# Patient Record
Sex: MALE | Race: WHITE | NOT HISPANIC OR LATINO | Employment: FULL TIME | URBAN - METROPOLITAN AREA
[De-identification: names, ages, dates, MRNs, and addresses within clinical notes are randomized per-mention and may not be internally consistent; named-entity substitution may affect disease eponyms.]

---

## 2017-10-23 ENCOUNTER — OFFICE VISIT (OUTPATIENT)
Dept: ORTHOPEDIC SURGERY | Facility: CLINIC | Age: 57
End: 2017-10-23

## 2017-10-23 VITALS
SYSTOLIC BLOOD PRESSURE: 146 MMHG | HEIGHT: 76 IN | DIASTOLIC BLOOD PRESSURE: 86 MMHG | BODY MASS INDEX: 29.96 KG/M2 | HEART RATE: 65 BPM | WEIGHT: 246 LBS

## 2017-10-23 DIAGNOSIS — M20.22 HALLUX RIGIDUS, BILATERAL: Primary | ICD-10-CM

## 2017-10-23 DIAGNOSIS — M20.21 HALLUX RIGIDUS, BILATERAL: Primary | ICD-10-CM

## 2017-10-23 PROCEDURE — 20600 DRAIN/INJ JOINT/BURSA W/O US: CPT | Performed by: ORTHOPAEDIC SURGERY

## 2017-10-23 PROCEDURE — 99203 OFFICE O/P NEW LOW 30 MIN: CPT | Performed by: ORTHOPAEDIC SURGERY

## 2017-10-23 RX ORDER — TRIAMCINOLONE ACETONIDE 40 MG/ML
20 INJECTION, SUSPENSION INTRA-ARTICULAR; INTRAMUSCULAR
Status: COMPLETED | OUTPATIENT
Start: 2017-10-23 | End: 2017-10-23

## 2017-10-23 RX ORDER — LIDOCAINE HYDROCHLORIDE 10 MG/ML
1 INJECTION, SOLUTION INFILTRATION; PERINEURAL
Status: COMPLETED | OUTPATIENT
Start: 2017-10-23 | End: 2017-10-23

## 2017-10-23 RX ORDER — NAPROXEN SODIUM 220 MG
220 TABLET ORAL DAILY
COMMUNITY

## 2017-10-23 RX ORDER — LISINOPRIL 5 MG/1
TABLET ORAL
Refills: 0 | COMMUNITY
Start: 2017-10-10 | End: 2019-06-01

## 2017-10-23 RX ORDER — LIDOCAINE HYDROCHLORIDE 10 MG/ML
0.5 INJECTION, SOLUTION INFILTRATION; PERINEURAL
Status: COMPLETED | OUTPATIENT
Start: 2017-10-23 | End: 2017-10-23

## 2017-10-23 RX ORDER — OMEPRAZOLE 20 MG/1
20 CAPSULE, DELAYED RELEASE ORAL DAILY
COMMUNITY

## 2017-10-23 RX ADMIN — LIDOCAINE HYDROCHLORIDE 0.5 ML: 10 INJECTION, SOLUTION INFILTRATION; PERINEURAL at 15:46

## 2017-10-23 RX ADMIN — TRIAMCINOLONE ACETONIDE 20 MG: 40 INJECTION, SUSPENSION INTRA-ARTICULAR; INTRAMUSCULAR at 15:46

## 2017-10-23 RX ADMIN — LIDOCAINE HYDROCHLORIDE 1 ML: 10 INJECTION, SOLUTION INFILTRATION; PERINEURAL at 15:46

## 2017-10-23 NOTE — PROGRESS NOTES
[facility] [place] [facility] [place]    Subjective     Chief Complaint   Patient presents with   • Right Foot - Pain   • Left Foot - Pain        HPI    Teresa Ang is a 57 y.o. male. He presents today for evaluation of both his feet, specifically the great toes.  Old injuries while he is playing football in 1979, but the pain particularly in the right foot has been worsening recently.  The left side is something that he can live with.  Pain is worse with walking, as well as climbing stairs.  He has aching and stabbing pains, associated with stiffness.  The pain is well located in the metatarsal phalangeal joint region of both feet, right greater than left.      Patient Active Problem List   Diagnosis   • Hypertensive heart disease   • Hypertension   • Osteoarthritis   • Thomas's syndrome     Past Medical History:   Diagnosis Date   • Thomas's syndrome    • Hypertension       Past Surgical History:   Procedure Laterality Date   • KNEE SURGERY Bilateral       Family History   Problem Relation Age of Onset   • Heart attack Mother    • Cancer Father      Skin   • Heart attack Father      Social History     Social History   • Marital status:      Spouse name: N/A   • Number of children: N/A   • Years of education: N/A     Occupational History   • Not on file.     Social History Main Topics   • Smoking status: Never Smoker   • Smokeless tobacco: Never Used   • Alcohol use Yes      Comment: Moderately   • Drug use: No   • Sexual activity: Defer     Other Topics Concern   • Not on file     Social History Narrative    Right handed      Current Outpatient Prescriptions on File Prior to Visit   Medication Sig Dispense Refill   • aspirin 81 MG tablet Take 81 mg by mouth daily  WHEN REMEMBERS     • bisoprolol-hydrochlorothiazide (ZIAC) 5-6.25 MG per tablet Take 1 tablet by mouth daily.       No current facility-administered medications on file prior to visit.       No Known Allergies     Review of Systems    Constitutional: Negative for activity change, appetite change, chills, diaphoresis, fatigue, fever and unexpected weight change.   HENT: Positive for sinus pressure. Negative for congestion, dental problem, drooling, ear discharge, ear pain, facial swelling, hearing loss, mouth sores, nosebleeds, postnasal drip, rhinorrhea, sneezing, sore throat, tinnitus, trouble swallowing and voice change.    Eyes: Negative for photophobia, pain, discharge, redness, itching and visual disturbance.   Respiratory: Negative for apnea, cough, choking, chest tightness, shortness of breath, wheezing and stridor.    Cardiovascular: Negative for chest pain, palpitations and leg swelling.   Gastrointestinal: Negative for abdominal distention, abdominal pain, anal bleeding, blood in stool, constipation, diarrhea, nausea, rectal pain and vomiting.   Endocrine: Negative for cold intolerance, heat intolerance, polydipsia, polyphagia and polyuria.   Genitourinary: Negative for decreased urine volume, difficulty urinating, dysuria, enuresis, flank pain, frequency, genital sores, hematuria and urgency.   Musculoskeletal: Positive for arthralgias, gait problem and neck pain. Negative for back pain, joint swelling, myalgias and neck stiffness.   Skin: Negative for color change, pallor, rash and wound.   Allergic/Immunologic: Negative for environmental allergies, food allergies and immunocompromised state.   Neurological: Positive for facial asymmetry and headaches. Negative for dizziness, tremors, seizures, syncope, speech difficulty, weakness, light-headedness and numbness.   Hematological: Negative for adenopathy. Does not bruise/bleed easily.   Psychiatric/Behavioral: Negative for agitation, behavioral problems, confusion, decreased concentration, dysphoric mood, hallucinations, self-injury, sleep disturbance and suicidal ideas. The patient is not nervous/anxious and is not hyperactive.         Objective      Physical Exam  /86  Pulse  "65  Ht 76\" (193 cm)  Wt 246 lb (112 kg)  BMI 29.94 kg/m2    Body mass index is 29.94 kg/(m^2).    General:   Mental Status:  Alert   Appearance: Cooperative, in no acute distress   Build and Nutrition: Well-nourished and well developed male   Orientation: Alert and oriented to person, place and time   Posture: Normal   Gait: Normal    Integument:   Right foot: No skin lesions, no rash, no ecchymosis   Left foot: No skin lesions, no rash, no ecchymosis    Neurologic:   Sensation:    Right foot: Intact to light touch on the dorsal and plantar aspect    Left foot: Intact to light touch on the dorsal and plantar aspect   Motor:  Right lower extremity: 5/5 ankle dorsiflexors, and ankle plantar flexors  Left lower extremity: 5/5 ankle dorsiflexors, and ankle plantar flexors    Vascular:   Right lower extremity: 2+ dorsalis pedis pulse, prompt capillary refill   Left lower extremity: 2+ dorsalis pedis pulse, prompt capillary refill    Lower Extremities:   Right Foot:    Tenderness:  Tender at the first metatarsal phalangeal joint of the right foot    Effusion: None    Swelling:  None    Crepitus:  Mild the first metatarsal phalangeal joint    Atrophy:  None    Range of motion: Normal inversion and eversion, but limited motion at the metatarsal phalangeal joint, with a 20° arc of motion  Instability:  None  Deformities:  None  Functional testing: Negative Ly’s test   Left Foot:    Tenderness:  None    Effusion:  None    Swelling:  None    Crepitus:  None    Atrophy:  None    Range of motion: normal inversion and eversion, but limited motion at the metatarsal phalangeal   joint, with a 30° arc of motion.  Instability:  None  Deformities:  None  Functional testing: Negative Ly’s test      Imaging/Studies      Imaging Results (last 24 hours)     Procedure Component Value Units Date/Time    XR Foot 3+ View Bilateral [[id number]] Resulted:  10/23/17 1533     Updated:  10/23/17 1534    Narrative:       Right Foot " Radiographs  Indication: right foot pain  Views: Weight-bearing AP and lateral, with oblique views of the right foot    Comparison: no prior studies available for review    Findings:  Arthritic changes are seen in the first metatarsal phalangeal joint, with   spur formation and joint space narrowing     Left Foot Radiographs  Indication: left foot pain  Views: Weight-bearing AP and lateral, with oblique views of the left foot    Comparison: no prior studies available for review    Findings:  Arthritic changes are seen, with mild joint space narrowing, but less   involved than the right foot.          Assessment and Plan     Teresa was seen today for pain and pain.    Diagnoses and all orders for this visit:    Hallux rigidus, bilateral  -     XR Foot 3+ View Bilateral    Other orders  -     Small Joint Arthrocentesis        I reviewed my findings with patient today.  Both of his metatarsal phalangeal joints bother him, consistent with hallux rigidus.  The right side is more involved than the left.  He would like to try an injection for the right foot, but may be interested in surgery in the future.  I will see him back in 6 weeks, but sooner for any problems.    He had 100% pain relief just a few minutes following the injection.    Return in about 6 weeks (around 12/4/2017) for Recheck without X-Rays.      Medical Decision Making  Management Options : prescription/IM medicine  Data/Risk: radiology tests and independent visualization of imaging, lab tests, or EMG/NCV      Hunter Aponte MD  10/23/17  4:06 PM

## 2017-12-04 ENCOUNTER — OFFICE VISIT (OUTPATIENT)
Dept: ORTHOPEDIC SURGERY | Facility: CLINIC | Age: 57
End: 2017-12-04

## 2017-12-04 VITALS
BODY MASS INDEX: 30.66 KG/M2 | SYSTOLIC BLOOD PRESSURE: 158 MMHG | HEART RATE: 66 BPM | HEIGHT: 76 IN | DIASTOLIC BLOOD PRESSURE: 92 MMHG | WEIGHT: 251.77 LBS

## 2017-12-04 DIAGNOSIS — M20.21 HALLUX RIGIDUS, BILATERAL: Primary | ICD-10-CM

## 2017-12-04 DIAGNOSIS — M20.22 HALLUX RIGIDUS, BILATERAL: Primary | ICD-10-CM

## 2017-12-04 PROCEDURE — 99212 OFFICE O/P EST SF 10 MIN: CPT | Performed by: ORTHOPAEDIC SURGERY

## 2017-12-04 NOTE — PROGRESS NOTES
"    [facility] – [place] [place] [facility] Note    Subjective     Chief Complaint   Patient presents with   • Right Foot - Pain, Follow-up   • Follow-up     6 week-right foot hallux rigidus        HPI    Teresa Ang is a 57 y.o. male. He follows up today for his right foot.  Pain is markedly improved after the injection on his last visit.  No complaints today.  Fully ambulatory in regular shoe gear.      Patient Active Problem List   Diagnosis   • Hypertensive heart disease   • Hypertension   • Osteoarthritis   • Thomas's syndrome     Past Medical History:   Diagnosis Date   • Thomas's syndrome    • Hypertension       Past Surgical History:   Procedure Laterality Date   • KNEE SURGERY Bilateral       Family History   Problem Relation Age of Onset   • Heart attack Mother    • Cancer Father      Skin   • Heart attack Father      Social History     Social History   • Marital status:      Spouse name: N/A   • Number of children: N/A   • Years of education: N/A     Occupational History   • Not on file.     Social History Main Topics   • Smoking status: Never Smoker   • Smokeless tobacco: Never Used   • Alcohol use Yes      Comment: Moderately   • Drug use: No   • Sexual activity: Defer     Other Topics Concern   • Not on file     Social History Narrative    Right handed      Current Outpatient Prescriptions on File Prior to Visit   Medication Sig Dispense Refill   • aspirin 81 MG tablet Take 81 mg by mouth As Needed. WHEN REMEMBERS      • bisoprolol-hydrochlorothiazide (ZIAC) 5-6.25 MG per tablet Take 1 tablet by mouth daily.     • lisinopril (PRINIVIL,ZESTRIL) 5 MG tablet   0   • naproxen sodium (ALEVE) 220 MG tablet Take 220 mg by mouth Daily.     • omeprazole (priLOSEC) 20 MG capsule Take 20 mg by mouth Daily.       No current facility-administered medications on file prior to visit.       No Known Allergies     Review of Systems     Objective      Physical Exam  /92  Pulse 66  Ht 76\" (193 cm)  Wt 251 lb " 12.3 oz (114 kg)  BMI 30.65 kg/m2    Body mass index is 30.65 kg/(m^2).    General:   Mental Status:  Alert   Appearance: Cooperative, in no acute distress   Build and Nutrition: Well-nourished and well developed male   Orientation: Alert and oriented to person, place and time   Posture: Normal   Gait: Normal    Integument:   Right foot: No skin lesions, no rash, no ecchymosis    Lower Extremities:   Right Foot:    Tenderness:  None    Swelling:  None    Range of motion: Normal inversion and eversion  Deformities:  None      Assessment and Plan     Teresa was seen today for follow-up, pain and follow-up.    Diagnoses and all orders for this visit:    Hallux rigidus, bilateral        I reviewed my findings with patient today.  His right foot respond well to the intra-articular injection for hallux rigidus.  I will see him back if there is any worsening or problems in the future.  Repeat injection could be considered.  Long-term, he may be a candidate for surgical intervention.    No Follow-up on file.          Hunter Aponte MD  12/04/17  4:27 PM

## 2019-06-02 PROCEDURE — 87329 GIARDIA AG IA: CPT | Performed by: NURSE PRACTITIONER

## 2019-06-02 PROCEDURE — 87177 OVA AND PARASITES SMEARS: CPT | Performed by: NURSE PRACTITIONER

## 2019-06-02 PROCEDURE — 87046 STOOL CULTR AEROBIC BACT EA: CPT | Performed by: NURSE PRACTITIONER

## 2019-06-02 PROCEDURE — 87045 FECES CULTURE AEROBIC BACT: CPT | Performed by: NURSE PRACTITIONER

## 2019-06-02 PROCEDURE — 87209 SMEAR COMPLEX STAIN: CPT | Performed by: NURSE PRACTITIONER

## 2019-06-02 PROCEDURE — 87328 CRYPTOSPORIDIUM AG IA: CPT | Performed by: NURSE PRACTITIONER

## 2019-06-06 ENCOUNTER — TELEPHONE (OUTPATIENT)
Dept: URGENT CARE | Facility: CLINIC | Age: 59
End: 2019-06-06

## 2019-06-07 ENCOUNTER — TELEPHONE (OUTPATIENT)
Dept: URGENT CARE | Facility: CLINIC | Age: 59
End: 2019-06-07

## 2019-11-06 DIAGNOSIS — I11.9 HYPERTENSIVE HEART DISEASE, UNSPECIFIED WHETHER HEART FAILURE PRESENT: Primary | ICD-10-CM

## 2019-11-07 ENCOUNTER — CONSULT (OUTPATIENT)
Dept: CARDIOLOGY | Facility: CLINIC | Age: 59
End: 2019-11-07

## 2019-11-07 ENCOUNTER — HOSPITAL ENCOUNTER (OUTPATIENT)
Dept: GENERAL RADIOLOGY | Facility: HOSPITAL | Age: 59
Discharge: HOME | End: 2019-11-07
Admitting: INTERNAL MEDICINE

## 2019-11-07 VITALS
HEART RATE: 72 BPM | SYSTOLIC BLOOD PRESSURE: 167 MMHG | HEIGHT: 76 IN | WEIGHT: 255 LBS | DIASTOLIC BLOOD PRESSURE: 106 MMHG | BODY MASS INDEX: 31.05 KG/M2

## 2019-11-07 DIAGNOSIS — E78.5 DYSLIPIDEMIA: ICD-10-CM

## 2019-11-07 DIAGNOSIS — I10 ESSENTIAL HYPERTENSION: ICD-10-CM

## 2019-11-07 DIAGNOSIS — R07.9 CHEST PAIN, UNSPECIFIED TYPE: Primary | ICD-10-CM

## 2019-11-07 DIAGNOSIS — R06.09 EXERTIONAL DYSPNEA: ICD-10-CM

## 2019-11-07 DIAGNOSIS — I11.9 HYPERTENSIVE HEART DISEASE, UNSPECIFIED WHETHER HEART FAILURE PRESENT: ICD-10-CM

## 2019-11-07 PROCEDURE — 93000 ELECTROCARDIOGRAM COMPLETE: CPT | Performed by: INTERNAL MEDICINE

## 2019-11-07 PROCEDURE — 99204 OFFICE O/P NEW MOD 45 MIN: CPT | Performed by: INTERNAL MEDICINE

## 2019-11-07 PROCEDURE — 71046 X-RAY EXAM CHEST 2 VIEWS: CPT

## 2019-11-07 RX ORDER — CARVEDILOL 25 MG/1
25 TABLET ORAL 2 TIMES DAILY WITH MEALS
Qty: 60 TABLET | Refills: 11 | Status: SHIPPED | OUTPATIENT
Start: 2019-11-07 | End: 2020-10-26

## 2019-11-07 RX ORDER — NITROGLYCERIN 0.4 MG/1
TABLET SUBLINGUAL
Qty: 25 TABLET | Refills: 3 | Status: SHIPPED | OUTPATIENT
Start: 2019-11-07 | End: 2019-11-11 | Stop reason: HOSPADM

## 2019-11-07 RX ORDER — CARVEDILOL 12.5 MG/1
12.5 TABLET ORAL 2 TIMES DAILY WITH MEALS
COMMUNITY
End: 2019-11-07 | Stop reason: SDUPTHER

## 2019-11-07 NOTE — H&P (VIEW-ONLY)
"[facility] at [facility]  INITIAL OFFICE CONSULT    Teresa Ang  : 1960  MRN:[id number]  Patient Address:  [address]  [place] [address]    Date of Encounter: 2019    PCP: Fuad Alexis MD  [id number] [facility] [id number]  Referring MD: Dr. Alexis     IDENTIFICATION: A 59 y.o. male resident of [place], [place]     Chief Complaint   Patient presents with   • Chest Pain     PROBLEM LIST:   1. Chest pain and exertional dyspnea   a. Echo 16: EF 65-70%, mild LVH   b. Chest pressure and worsening dyspnea Fall   2. Hypertension   3. Thomas's syndrome, right eye   4. Dyslipidemia   5. GERD    ALLERGIES: No Known Allergies    CURRENT MEDICATIONS:   •  aspirin 81 MG tablet, Take 81 mg by mouth Daily.  •  carvedilol (COREG) 12.5 MG tablet, Take 12.5 mg by mouth 2 (Two) Times a Day With Meals.  •  hydrALAZINE (APRESOLINE) 10 MG tablet, Take 20 mg by mouth 2 (Two) Times a Day  •  naproxen sodium (ALEVE) 220 MG tablet, Take 220 mg by mouth Daily  •  omeprazole (priLOSEC) 20 MG capsule, Take 20 mg by mouth Daily.    HPI: Mr. Ang is a pleasant 60 y/o WM with history of HTN who is referred from Dr. Alexis for recent onset chest pain. He reports a 3 month history of precordial chest tightness/heaviness at rest or with exertion that is relatively brief in nature but \"irritating.\". Two weeks ago he reports a more significant episode where he thought he was going to have to go to the ER. He had midsternal chest pain and pressure which lasted <5 minutes and was not associated with any significant symptoms. He is very active and is currently working as a . He reports worsening exertional dyspnea for the past few months as well, especially when going up an incline or having to walk fast or jog. He denies palpitations, syncope or heart failure symptoms. No history of MI, CVA, DVT/PE.  He also reports recent elevated blood pressures, and his medical regimen " "was adjusted by Dr. Alexis just 2 days ago. No tobacco, occasional alcohol, no drug use. No significant family history of premature CAD.     Cardiac Risk Factors include: advanced age (older than 55 for men, 65 for women), hypertension and male gender    ROS: All systems have been reviewed and are negative with the exception of those mentioned in the HPI and problem list above.    Surgical History:   Past Surgical History:   Procedure Laterality Date   • KNEE SURGERY Bilateral        Social History:   Social History     Socioeconomic History   • Marital status:    Tobacco Use   • Smoking status: Never Smoker   • Smokeless tobacco: Never Used   Substance and Sexual Activity   • Alcohol use: Yes     Comment: Moderately   • Drug use: No   • Sexual activity: Defer     Family History:   Family History   Problem Relation Age of Onset   • Heart attack Mother    • Cancer Father         Skin   • Heart attack Father        Objective     Vitals:    11/07/19 1021 11/07/19 1027 11/07/19 1028   BP: (!) 138/101 (!) 162/107 (!) 167/106   BP Location: Right arm Left arm Left arm   Patient Position: Sitting Sitting Standing   Pulse: 72 73 72   Weight: 116 kg (255 lb)     Height: 193 cm (76\")       Body mass index is 31.04 kg/m².    PHYSICAL EXAM:  CONSTITUTIONAL: Well nourished, cooperative, in no acute distress  HEENT: Normocephalic, atraumatic, PERRLA, no JVD, no carotid bruit  CARDIOVASCULAR:  Regular rhythm and normal rate, no murmur, gallop, rub. Peripheral pulses are present and equal bilaterally  RESPIRATORY: Clear to auscultation, normal respiratory effort, no wheezing, rales or ronchi  GI: Soft, nontender, normal bowel sounds  MUSCULOSKELETAL: No gross deformities, no edema  SKIN: Warm, dry. No bleeding, bruising or rash  NEUROLOGICAL: No focal deficits  PSYCHIATRIC: Normal mood and affect. Behavior is normal     Labs/Diagnostic Data  Labs 12/2018:  CBC: WBC 4.9, RBC 5.26, Hgb 16.4, Hct 45.9, Plt 198  Sed rate: 2; " C-RP <0.2  TSH: 2.73  CMP: Na 143, K 4.6, Cl 107, CO2 32, Glucose 128, BUN 18, Cr 1.1, eGFR 73, Alk Phos 68, AST 20, ALT 38  Lipid Panel: , HDL 54, LDL 76, Trig 275      ECG 12 Lead  Date/Time: 11/7/2019 11:09 AM  Performed by: Jessica Mckenzie PA-C  Authorized by: Jessica Mckenzie PA-C   Comparison: compared with previous ECG from 6/30/2016  Similar to previous ECG  Rhythm: sinus rhythm  Rate: normal  BPM: 70  Conduction: conduction normal  QRS axis: normal    Clinical impression: normal ECG          Radiology Data:   CXR images: No acute cardiopulmonary process. Official report pending.     Assessment and Plan:     1. Exertional dyspnea and chest pressure: Symptoms are concerning for recent onset angina and he needs a heart catheterization for further evaluation. We will give him SL Nitro to use as needed, and increase his Coreg to 25mg BID.  2. HTN: elevated, see  #1.   3. HLD: will check lipid panel at time of cath.  4. Final disposition to follow.     Thank you for allowing me to participate in the care of Teresa Ang. Feel free to contact me directly with any further questions or concerns.    Scribed for Hunter Branham MD by Jessica Mckenzie PA-C. 11/7/2019  10:32 AM       Update 11/11/2019  Patient is here today in [facility] to undergo LHC +/- PCI, Dr. Branham - as mentioned above.  Labs are pending.  No change in history since office visit 11/7.    Risks and benefits have been reviewed with patient and he is willing to proceed.  Further recommendations based on outcomes.  Jovanna Berman PA-C  11/11/19 0821 a.m.    Electronically signed by ENRICO Vargas, 11/11/19, 8:21 AM.

## 2019-11-07 NOTE — PROGRESS NOTES
"[facility] at [facility]  INITIAL OFFICE CONSULT    Teresa Ang  : 1960  MRN:[id number]  Patient Address:  [address]  [place] [address]    Date of Encounter: 2019    PCP: Fuad Alexis MD  [id number] [facility] [address]  Referring MD: Dr. Alexis     IDENTIFICATION: A 59 y.o. male resident of [place], [place]     Chief Complaint   Patient presents with   • Chest Pain     PROBLEM LIST:   1. Chest pain and exertional dyspnea   a. Echo 16: EF 65-70%, mild LVH   b. Chest pressure and worsening dyspnea Fall   2. Hypertension   3. Thomas's syndrome, right eye   4. Dyslipidemia   5. GERD    ALLERGIES: No Known Allergies    CURRENT MEDICATIONS:   •  aspirin 81 MG tablet, Take 81 mg by mouth Daily.  •  carvedilol (COREG) 12.5 MG tablet, Take 12.5 mg by mouth 2 (Two) Times a Day With Meals.  •  hydrALAZINE (APRESOLINE) 10 MG tablet, Take 20 mg by mouth 2 (Two) Times a Day  •  naproxen sodium (ALEVE) 220 MG tablet, Take 220 mg by mouth Daily  •  omeprazole (priLOSEC) 20 MG capsule, Take 20 mg by mouth Daily.    HPI: Mr. Ang is a pleasant 58 y/o WM with history of HTN who is referred from Dr. Alexis for recent onset chest pain. He reports a 3 month history of precordial chest tightness/heaviness at rest or with exertion that is relatively brief in nature but \"irritating.\". Two weeks ago he reports a more significant episode where he thought he was going to have to go to the ER. He had midsternal chest pain and pressure which lasted <5 minutes and was not associated with any significant symptoms. He is very active and is currently working as a . He reports worsening exertional dyspnea for the past few months as well, especially when going up an incline or having to walk fast or jog. He denies palpitations, syncope or heart failure symptoms. No history of MI, CVA, DVT/PE.  He also reports recent elevated blood pressures, and his medical regimen " "was adjusted by Dr. Alexis just 2 days ago. No tobacco, occasional alcohol, no drug use. No significant family history of premature CAD.     Cardiac Risk Factors include: advanced age (older than 55 for men, 65 for women), hypertension and male gender    ROS: All systems have been reviewed and are negative with the exception of those mentioned in the HPI and problem list above.    Surgical History:   Past Surgical History:   Procedure Laterality Date   • KNEE SURGERY Bilateral        Social History:   Social History     Socioeconomic History   • Marital status:    Tobacco Use   • Smoking status: Never Smoker   • Smokeless tobacco: Never Used   Substance and Sexual Activity   • Alcohol use: Yes     Comment: Moderately   • Drug use: No   • Sexual activity: Defer     Family History:   Family History   Problem Relation Age of Onset   • Heart attack Mother    • Cancer Father         Skin   • Heart attack Father        Objective     Vitals:    11/07/19 1021 11/07/19 1027 11/07/19 1028   BP: (!) 138/101 (!) 162/107 (!) 167/106   BP Location: Right arm Left arm Left arm   Patient Position: Sitting Sitting Standing   Pulse: 72 73 72   Weight: 116 kg (255 lb)     Height: 193 cm (76\")       Body mass index is 31.04 kg/m².    PHYSICAL EXAM:  CONSTITUTIONAL: Well nourished, cooperative, in no acute distress  HEENT: Normocephalic, atraumatic, PERRLA, no JVD, no carotid bruit  CARDIOVASCULAR:  Regular rhythm and normal rate, no murmur, gallop, rub. Peripheral pulses are present and equal bilaterally  RESPIRATORY: Clear to auscultation, normal respiratory effort, no wheezing, rales or ronchi  GI: Soft, nontender, normal bowel sounds  MUSCULOSKELETAL: No gross deformities, no edema  SKIN: Warm, dry. No bleeding, bruising or rash  NEUROLOGICAL: No focal deficits  PSYCHIATRIC: Normal mood and affect. Behavior is normal     Labs/Diagnostic Data  Labs 12/2018:  CBC: WBC 4.9, RBC 5.26, Hgb 16.4, Hct 45.9, Plt 198  Sed rate: 2; " C-RP <0.2  TSH: 2.73  CMP: Na 143, K 4.6, Cl 107, CO2 32, Glucose 128, BUN 18, Cr 1.1, eGFR 73, Alk Phos 68, AST 20, ALT 38  Lipid Panel: , HDL 54, LDL 76, Trig 275      ECG 12 Lead  Date/Time: 11/7/2019 11:09 AM  Performed by: Jessica Mckenzie PA-C  Authorized by: Jessica Mckenzie PA-C   Comparison: compared with previous ECG from 6/30/2016  Similar to previous ECG  Rhythm: sinus rhythm  Rate: normal  BPM: 70  Conduction: conduction normal  QRS axis: normal    Clinical impression: normal ECG          Radiology Data:   CXR images: No acute cardiopulmonary process. Official report pending.     Assessment and Plan:     1. Exertional dyspnea and chest pressure: Symptoms are concerning for recent onset angina and he needs a heart catheterization for further evaluation. We will give him SL Nitro to use as needed, and increase his Coreg to 25mg BID.  2. HTN: elevated, see  #1.   3. HLD: will check lipid panel at time of cath.  4. Final disposition to follow.     Thank you for allowing me to participate in the care of Teresa Ang. Feel free to contact me directly with any further questions or concerns.    Scribed for Hunter Branham MD by Jessica Mckenzie PA-C. 11/7/2019  10:32 AM   Electronically signed by Hunter Branham MD, 11/19/19, 1:33 PM.      Update 11/11/2019  Patient is here today in [facility] to undergo LHC +/- PCI, Dr. Branham - as mentioned above.  Labs are pending.  No change in history since office visit 11/7.    Risks and benefits have been reviewed with patient and he is willing to proceed.  Further recommendations based on outcomes.  Jovanna Berman PA-C  11/11/19 0821 a.m.    Electronically signed by ENRICO Vargas, 11/11/19, 8:21 AM.

## 2019-11-08 ENCOUNTER — PREP FOR SURGERY (OUTPATIENT)
Dept: OTHER | Facility: HOSPITAL | Age: 59
End: 2019-11-08

## 2019-11-08 PROBLEM — E78.5 DYSLIPIDEMIA: Status: ACTIVE | Noted: 2019-11-08

## 2019-11-08 PROBLEM — R07.9 CHEST PAIN: Status: ACTIVE | Noted: 2019-11-08

## 2019-11-08 PROBLEM — R06.09 EXERTIONAL DYSPNEA: Status: ACTIVE | Noted: 2019-11-08

## 2019-11-08 PROBLEM — I10 ESSENTIAL HYPERTENSION: Status: ACTIVE | Noted: 2019-11-08

## 2019-11-08 RX ORDER — SODIUM CHLORIDE 0.9 % (FLUSH) 0.9 %
3 SYRINGE (ML) INJECTION EVERY 12 HOURS SCHEDULED
Status: CANCELLED | OUTPATIENT
Start: 2019-11-08

## 2019-11-08 RX ORDER — ACETAMINOPHEN 325 MG/1
650 TABLET ORAL EVERY 4 HOURS PRN
Status: CANCELLED | OUTPATIENT
Start: 2019-11-08

## 2019-11-08 RX ORDER — CLOPIDOGREL BISULFATE 75 MG/1
75 TABLET ORAL DAILY
Status: CANCELLED | OUTPATIENT
Start: 2019-11-09

## 2019-11-08 RX ORDER — CLOPIDOGREL BISULFATE 75 MG/1
600 TABLET ORAL ONCE
Status: CANCELLED | OUTPATIENT
Start: 2019-11-08 | End: 2019-11-08

## 2019-11-08 RX ORDER — SODIUM CHLORIDE 0.9 % (FLUSH) 0.9 %
10 SYRINGE (ML) INJECTION AS NEEDED
Status: CANCELLED | OUTPATIENT
Start: 2019-11-08

## 2019-11-08 RX ORDER — NITROGLYCERIN 0.4 MG/1
0.4 TABLET SUBLINGUAL
Status: CANCELLED | OUTPATIENT
Start: 2019-11-08

## 2019-11-11 ENCOUNTER — HOSPITAL ENCOUNTER (OUTPATIENT)
Facility: HOSPITAL | Age: 59
Discharge: HOME | End: 2019-11-11
Attending: INTERNAL MEDICINE | Admitting: INTERNAL MEDICINE

## 2019-11-11 ENCOUNTER — APPOINTMENT (OUTPATIENT)
Dept: CARDIOLOGY | Facility: HOSPITAL | Age: 59
End: 2019-11-11

## 2019-11-11 VITALS
HEART RATE: 62 BPM | BODY MASS INDEX: 31.14 KG/M2 | DIASTOLIC BLOOD PRESSURE: 116 MMHG | OXYGEN SATURATION: 97 % | SYSTOLIC BLOOD PRESSURE: 143 MMHG | WEIGHT: 255.73 LBS | HEIGHT: 76 IN | RESPIRATION RATE: 16 BRPM | TEMPERATURE: 97.3 F

## 2019-11-11 DIAGNOSIS — I10 ESSENTIAL HYPERTENSION: ICD-10-CM

## 2019-11-11 DIAGNOSIS — E78.5 DYSLIPIDEMIA: ICD-10-CM

## 2019-11-11 DIAGNOSIS — R07.9 CHEST PAIN, UNSPECIFIED TYPE: ICD-10-CM

## 2019-11-11 DIAGNOSIS — R06.09 EXERTIONAL DYSPNEA: ICD-10-CM

## 2019-11-11 LAB
ALBUMIN SERPL-MCNC: 3.7 G/DL (ref 3.5–5.2)
ALBUMIN/GLOB SERPL: 1.3 G/DL
ALP SERPL-CCNC: 59 U/L (ref 39–117)
ALT SERPL W P-5'-P-CCNC: 23 U/L (ref 1–41)
ANION GAP SERPL CALCULATED.3IONS-SCNC: 8 MMOL/L (ref 5–15)
AST SERPL-CCNC: 22 U/L (ref 1–40)
BH CV ECHO MEAS - AO ROOT AREA (BSA CORRECTED): 1.5
BH CV ECHO MEAS - AO ROOT AREA: 10.4 CM^2
BH CV ECHO MEAS - AO ROOT DIAM: 3.6 CM
BH CV ECHO MEAS - BSA(HAYCOCK): 2.5 M^2
BH CV ECHO MEAS - BSA: 2.5 M^2
BH CV ECHO MEAS - BZI_BMI: 31.2 KILOGRAMS/M^2
BH CV ECHO MEAS - BZI_METRIC_HEIGHT: 193 CM
BH CV ECHO MEAS - BZI_METRIC_WEIGHT: 116.1 KG
BH CV ECHO MEAS - EDV(CUBED): 75.2 ML
BH CV ECHO MEAS - EDV(MOD-SP2): 98 ML
BH CV ECHO MEAS - EDV(MOD-SP4): 176 ML
BH CV ECHO MEAS - EDV(TEICH): 79.5 ML
BH CV ECHO MEAS - EF(CUBED): 73.3 %
BH CV ECHO MEAS - EF(MOD-SP2): 49 %
BH CV ECHO MEAS - EF(MOD-SP4): 52.3 %
BH CV ECHO MEAS - EF(TEICH): 65.4 %
BH CV ECHO MEAS - ESV(CUBED): 20.1 ML
BH CV ECHO MEAS - ESV(MOD-SP2): 50 ML
BH CV ECHO MEAS - ESV(MOD-SP4): 84 ML
BH CV ECHO MEAS - ESV(TEICH): 27.5 ML
BH CV ECHO MEAS - FS: 35.6 %
BH CV ECHO MEAS - IVS/LVPW: 0.98
BH CV ECHO MEAS - IVSD: 1 CM
BH CV ECHO MEAS - LA DIMENSION: 2.9 CM
BH CV ECHO MEAS - LA/AO: 0.81
BH CV ECHO MEAS - LAD MAJOR: 4.2 CM
BH CV ECHO MEAS - LAT PEAK E' VEL: 8 CM/SEC
BH CV ECHO MEAS - LATERAL E/E' RATIO: 5.4
BH CV ECHO MEAS - LV DIASTOLIC VOL/BSA (35-75): 71.5 ML/M^2
BH CV ECHO MEAS - LV MASS(C)D: 143 GRAMS
BH CV ECHO MEAS - LV MASS(C)DI: 58.1 GRAMS/M^2
BH CV ECHO MEAS - LV MAX PG: 1.8 MMHG
BH CV ECHO MEAS - LV MEAN PG: 0.85 MMHG
BH CV ECHO MEAS - LV SYSTOLIC VOL/BSA (12-30): 34.1 ML/M^2
BH CV ECHO MEAS - LV V1 MAX: 67.1 CM/SEC
BH CV ECHO MEAS - LV V1 MEAN: 42.7 CM/SEC
BH CV ECHO MEAS - LV V1 VTI: 16.1 CM
BH CV ECHO MEAS - LVIDD: 4.2 CM
BH CV ECHO MEAS - LVIDS: 2.7 CM
BH CV ECHO MEAS - LVLD AP2: 8.1 CM
BH CV ECHO MEAS - LVLD AP4: 10.5 CM
BH CV ECHO MEAS - LVLS AP2: 6.1 CM
BH CV ECHO MEAS - LVLS AP4: 8.6 CM
BH CV ECHO MEAS - LVOT AREA (M): 3.1 CM^2
BH CV ECHO MEAS - LVOT AREA: 3 CM^2
BH CV ECHO MEAS - LVOT DIAM: 2 CM
BH CV ECHO MEAS - LVPWD: 1 CM
BH CV ECHO MEAS - MED PEAK E' VEL: 5.6 CM/SEC
BH CV ECHO MEAS - MEDIAL E/E' RATIO: 7.7
BH CV ECHO MEAS - MV A MAX VEL: 48.4 CM/SEC
BH CV ECHO MEAS - MV DEC TIME: 0.14 SEC
BH CV ECHO MEAS - MV E MAX VEL: 44.4 CM/SEC
BH CV ECHO MEAS - MV E/A: 0.92
BH CV ECHO MEAS - PA ACC SLOPE: 398.7 CM/SEC^2
BH CV ECHO MEAS - PA ACC TIME: 0.12 SEC
BH CV ECHO MEAS - PA PR(ACCEL): 25.5 MMHG
BH CV ECHO MEAS - RVDD: 2.8 CM
BH CV ECHO MEAS - SI(CUBED): 22.4 ML/M^2
BH CV ECHO MEAS - SI(LVOT): 19.9 ML/M^2
BH CV ECHO MEAS - SI(MOD-SP2): 19.5 ML/M^2
BH CV ECHO MEAS - SI(MOD-SP4): 37.4 ML/M^2
BH CV ECHO MEAS - SI(TEICH): 21.1 ML/M^2
BH CV ECHO MEAS - SV(CUBED): 55.1 ML
BH CV ECHO MEAS - SV(LVOT): 49 ML
BH CV ECHO MEAS - SV(MOD-SP2): 48 ML
BH CV ECHO MEAS - SV(MOD-SP4): 92 ML
BH CV ECHO MEAS - SV(TEICH): 52 ML
BH CV ECHO MEAS - TAPSE (>1.6): 2.2 CM2
BH CV ECHO MEASUREMENTS AVERAGE E/E' RATIO: 6.53
BH CV VAS BP LEFT ARM: NORMAL MMHG
BH CV XLRA - RV BASE: 4.7 CM
BH CV XLRA - RV LENGTH: 7.6 CM
BH CV XLRA - RV MID: 3.5 CM
BH CV XLRA - TDI S': 15.2 CM/SEC
BILIRUB SERPL-MCNC: 0.5 MG/DL (ref 0.2–1.2)
BUN BLD-MCNC: 20 MG/DL (ref 8–23)
BUN/CREAT SERPL: 21.7 (ref 7–25)
CALCIUM SPEC-SCNC: 9 MG/DL (ref 8.6–10.5)
CHLORIDE SERPL-SCNC: 104 MMOL/L (ref 98–107)
CHOLEST SERPL-MCNC: 179 MG/DL (ref 0–200)
CO2 SERPL-SCNC: 26 MMOL/L (ref 22–29)
CREAT BLD-MCNC: 0.92 MG/DL (ref 0.76–1.27)
DEPRECATED RDW RBC AUTO: 40.7 FL (ref 37–54)
ERYTHROCYTE [DISTWIDTH] IN BLOOD BY AUTOMATED COUNT: 12.3 % (ref 12.3–15.4)
GFR SERPL CREATININE-BSD FRML MDRD: 84 ML/MIN/1.73
GLOBULIN UR ELPH-MCNC: 2.9 GM/DL
GLUCOSE BLD-MCNC: 129 MG/DL (ref 65–99)
HBA1C MFR BLD: 5.3 % (ref 4.8–5.6)
HCT VFR BLD AUTO: 45.6 % (ref 37.5–51)
HDLC SERPL-MCNC: 59 MG/DL (ref 40–60)
HGB BLD-MCNC: 15.6 G/DL (ref 13–17.7)
LDLC SERPL CALC-MCNC: 92 MG/DL (ref 0–100)
LDLC/HDLC SERPL: 1.57 {RATIO}
MAXIMAL PREDICTED HEART RATE: 159 BPM
MCH RBC QN AUTO: 31.1 PG (ref 26.6–33)
MCHC RBC AUTO-ENTMCNC: 34.2 G/DL (ref 31.5–35.7)
MCV RBC AUTO: 91 FL (ref 79–97)
PLATELET # BLD AUTO: 180 10*3/MM3 (ref 140–450)
PMV BLD AUTO: 10.3 FL (ref 6–12)
POTASSIUM BLD-SCNC: 4.2 MMOL/L (ref 3.5–5.2)
PROT SERPL-MCNC: 6.6 G/DL (ref 6–8.5)
RBC # BLD AUTO: 5.01 10*6/MM3 (ref 4.14–5.8)
SODIUM BLD-SCNC: 138 MMOL/L (ref 136–145)
STRESS TARGET HR: 135 BPM
TRIGL SERPL-MCNC: 138 MG/DL (ref 0–150)
VLDLC SERPL-MCNC: 27.6 MG/DL
WBC NRBC COR # BLD: 6.45 10*3/MM3 (ref 3.4–10.8)

## 2019-11-11 PROCEDURE — 25010000002 FENTANYL CITRATE (PF) 100 MCG/2ML SOLUTION: Performed by: INTERNAL MEDICINE

## 2019-11-11 PROCEDURE — 80061 LIPID PANEL: CPT | Performed by: PHYSICIAN ASSISTANT

## 2019-11-11 PROCEDURE — 93325 DOPPLER ECHO COLOR FLOW MAPG: CPT | Performed by: INTERNAL MEDICINE

## 2019-11-11 PROCEDURE — C1760 CLOSURE DEV, VASC: HCPCS | Performed by: INTERNAL MEDICINE

## 2019-11-11 PROCEDURE — 93321 DOPPLER ECHO F-UP/LMTD STD: CPT | Performed by: INTERNAL MEDICINE

## 2019-11-11 PROCEDURE — 93458 L HRT ARTERY/VENTRICLE ANGIO: CPT | Performed by: INTERNAL MEDICINE

## 2019-11-11 PROCEDURE — 36415 COLL VENOUS BLD VENIPUNCTURE: CPT

## 2019-11-11 PROCEDURE — 0 IOPAMIDOL PER 1 ML: Performed by: INTERNAL MEDICINE

## 2019-11-11 PROCEDURE — C1769 GUIDE WIRE: HCPCS | Performed by: INTERNAL MEDICINE

## 2019-11-11 PROCEDURE — 93308 TTE F-UP OR LMTD: CPT | Performed by: INTERNAL MEDICINE

## 2019-11-11 PROCEDURE — 80053 COMPREHEN METABOLIC PANEL: CPT | Performed by: PHYSICIAN ASSISTANT

## 2019-11-11 PROCEDURE — 25010000002 MIDAZOLAM PER 1 MG: Performed by: INTERNAL MEDICINE

## 2019-11-11 PROCEDURE — 93306 TTE W/DOPPLER COMPLETE: CPT

## 2019-11-11 PROCEDURE — 85027 COMPLETE CBC AUTOMATED: CPT | Performed by: PHYSICIAN ASSISTANT

## 2019-11-11 PROCEDURE — 83036 HEMOGLOBIN GLYCOSYLATED A1C: CPT | Performed by: PHYSICIAN ASSISTANT

## 2019-11-11 PROCEDURE — C1894 INTRO/SHEATH, NON-LASER: HCPCS | Performed by: INTERNAL MEDICINE

## 2019-11-11 RX ORDER — LIDOCAINE HYDROCHLORIDE 10 MG/ML
INJECTION, SOLUTION EPIDURAL; INFILTRATION; INTRACAUDAL; PERINEURAL AS NEEDED
Status: DISCONTINUED | OUTPATIENT
Start: 2019-11-11 | End: 2019-11-11 | Stop reason: HOSPADM

## 2019-11-11 RX ORDER — ASPIRIN 81 MG/1
81 TABLET, CHEWABLE ORAL DAILY
Status: DISCONTINUED | OUTPATIENT
Start: 2019-11-11 | End: 2019-11-11

## 2019-11-11 RX ORDER — NITROGLYCERIN 0.4 MG/1
0.4 TABLET SUBLINGUAL
Status: DISCONTINUED | OUTPATIENT
Start: 2019-11-11 | End: 2019-11-11 | Stop reason: HOSPADM

## 2019-11-11 RX ORDER — MIDAZOLAM HYDROCHLORIDE 1 MG/ML
INJECTION INTRAMUSCULAR; INTRAVENOUS AS NEEDED
Status: DISCONTINUED | OUTPATIENT
Start: 2019-11-11 | End: 2019-11-11 | Stop reason: HOSPADM

## 2019-11-11 RX ORDER — SODIUM CHLORIDE 0.9 % (FLUSH) 0.9 %
10 SYRINGE (ML) INJECTION AS NEEDED
Status: DISCONTINUED | OUTPATIENT
Start: 2019-11-11 | End: 2019-11-11 | Stop reason: HOSPADM

## 2019-11-11 RX ORDER — CARVEDILOL 12.5 MG/1
25 TABLET ORAL 2 TIMES DAILY WITH MEALS
Status: DISCONTINUED | OUTPATIENT
Start: 2019-11-11 | End: 2019-11-11 | Stop reason: HOSPADM

## 2019-11-11 RX ORDER — CLOPIDOGREL BISULFATE 75 MG/1
75 TABLET ORAL DAILY
Status: DISCONTINUED | OUTPATIENT
Start: 2019-11-12 | End: 2019-11-11 | Stop reason: HOSPADM

## 2019-11-11 RX ORDER — HYDRALAZINE HYDROCHLORIDE 10 MG/1
20 TABLET, FILM COATED ORAL 2 TIMES DAILY
Status: DISCONTINUED | OUTPATIENT
Start: 2019-11-11 | End: 2019-11-11 | Stop reason: HOSPADM

## 2019-11-11 RX ORDER — CLOPIDOGREL BISULFATE 75 MG/1
600 TABLET ORAL ONCE
Status: COMPLETED | OUTPATIENT
Start: 2019-11-11 | End: 2019-11-11

## 2019-11-11 RX ORDER — AMLODIPINE BESYLATE 10 MG/1
10 TABLET ORAL DAILY
Qty: 30 TABLET | Refills: 10 | Status: SHIPPED | OUTPATIENT
Start: 2019-11-11 | End: 2020-07-31

## 2019-11-11 RX ORDER — SODIUM CHLORIDE 0.9 % (FLUSH) 0.9 %
3 SYRINGE (ML) INJECTION EVERY 12 HOURS SCHEDULED
Status: DISCONTINUED | OUTPATIENT
Start: 2019-11-11 | End: 2019-11-11 | Stop reason: HOSPADM

## 2019-11-11 RX ORDER — PANTOPRAZOLE SODIUM 40 MG/1
40 TABLET, DELAYED RELEASE ORAL EVERY MORNING
Status: DISCONTINUED | OUTPATIENT
Start: 2019-11-12 | End: 2019-11-11 | Stop reason: HOSPADM

## 2019-11-11 RX ORDER — SODIUM CHLORIDE 9 MG/ML
330 INJECTION, SOLUTION INTRAVENOUS CONTINUOUS
Status: ACTIVE | OUTPATIENT
Start: 2019-11-11 | End: 2019-11-11

## 2019-11-11 RX ORDER — CHLORTHALIDONE 25 MG/1
25 TABLET ORAL DAILY
Qty: 30 TABLET | Refills: 12 | Status: SHIPPED | OUTPATIENT
Start: 2019-11-11 | End: 2020-10-26

## 2019-11-11 RX ORDER — ASPIRIN 81 MG/1
243 TABLET ORAL ONCE
Status: COMPLETED | OUTPATIENT
Start: 2019-11-11 | End: 2019-11-11

## 2019-11-11 RX ORDER — NAPROXEN 250 MG/1
250 TABLET ORAL 2 TIMES DAILY WITH MEALS
Status: DISCONTINUED | OUTPATIENT
Start: 2019-11-11 | End: 2019-11-11 | Stop reason: HOSPADM

## 2019-11-11 RX ORDER — FENTANYL CITRATE 50 UG/ML
INJECTION, SOLUTION INTRAMUSCULAR; INTRAVENOUS AS NEEDED
Status: DISCONTINUED | OUTPATIENT
Start: 2019-11-11 | End: 2019-11-11 | Stop reason: HOSPADM

## 2019-11-11 RX ORDER — ACETAMINOPHEN 325 MG/1
650 TABLET ORAL EVERY 4 HOURS PRN
Status: DISCONTINUED | OUTPATIENT
Start: 2019-11-11 | End: 2019-11-11 | Stop reason: HOSPADM

## 2019-11-11 RX ADMIN — SODIUM CHLORIDE 330 ML/HR: 9 INJECTION, SOLUTION INTRAVENOUS at 09:11

## 2019-11-11 RX ADMIN — ASPIRIN 243 MG: 81 TABLET, COATED ORAL at 09:10

## 2019-11-11 RX ADMIN — CLOPIDOGREL BISULFATE 600 MG: 75 TABLET ORAL at 09:10

## 2019-11-11 NOTE — PROGRESS NOTES
The echo is normal.  He has only minor non-obstructive plaque.    I'll stop hydralazine and add Amlodipine,10 mg and chlorthalidone, 12.5mg po daily.    He will follow-up with Dr. Alexis and see me only as necessary in the future.

## 2019-11-12 ENCOUNTER — DOCUMENTATION (OUTPATIENT)
Dept: CARDIAC REHAB | Facility: HOSPITAL | Age: 59
End: 2019-11-12

## 2020-02-12 ENCOUNTER — AMBULATORY SURGICAL CENTER (OUTPATIENT)
Dept: URBAN - METROPOLITAN AREA SURGERY 10 | Facility: SURGERY | Age: 60
End: 2020-02-12

## 2020-02-12 ENCOUNTER — OFFICE (OUTPATIENT)
Dept: URBAN - METROPOLITAN AREA PATHOLOGY 4 | Facility: PATHOLOGY | Age: 60
End: 2020-02-12
Payer: COMMERCIAL

## 2020-02-12 DIAGNOSIS — K22.4 DYSKINESIA OF ESOPHAGUS: ICD-10-CM

## 2020-02-12 DIAGNOSIS — R10.13 EPIGASTRIC PAIN: ICD-10-CM

## 2020-02-12 DIAGNOSIS — K21.9 GASTRO-ESOPHAGEAL REFLUX DISEASE WITHOUT ESOPHAGITIS: ICD-10-CM

## 2020-02-12 DIAGNOSIS — R07.89 OTHER CHEST PAIN: ICD-10-CM

## 2020-02-12 DIAGNOSIS — K29.60 OTHER GASTRITIS WITHOUT BLEEDING: ICD-10-CM

## 2020-02-12 DIAGNOSIS — K22.70 BARRETT'S ESOPHAGUS WITHOUT DYSPLASIA: ICD-10-CM

## 2020-02-12 PROCEDURE — 88305 TISSUE EXAM BY PATHOLOGIST: CPT | Performed by: INTERNAL MEDICINE

## 2020-02-12 PROCEDURE — 43239 EGD BIOPSY SINGLE/MULTIPLE: CPT | Performed by: INTERNAL MEDICINE

## 2020-02-13 PROBLEM — R10.13 EPIGASTRIC PAIN: Status: ACTIVE | Noted: 2020-02-13

## 2020-02-13 PROBLEM — K21.9 GASTROESOPHAGEAL REFLUX DISEASE: Status: ACTIVE | Noted: 2020-02-13

## 2020-02-13 PROBLEM — R53.83 FATIGUE: Status: ACTIVE | Noted: 2020-02-13

## 2020-07-31 RX ORDER — AMLODIPINE BESYLATE 10 MG/1
TABLET ORAL
Qty: 90 TABLET | Refills: 0 | Status: SHIPPED | OUTPATIENT
Start: 2020-07-31 | End: 2022-05-16

## 2020-10-23 RX ORDER — AMLODIPINE BESYLATE 10 MG/1
TABLET ORAL
Qty: 90 TABLET | Refills: 0 | OUTPATIENT
Start: 2020-10-23

## 2020-10-26 RX ORDER — CHLORTHALIDONE 25 MG/1
TABLET ORAL
Qty: 90 TABLET | Refills: 0 | Status: SHIPPED | OUTPATIENT
Start: 2020-10-26 | End: 2022-05-16

## 2020-10-26 RX ORDER — CARVEDILOL 25 MG/1
TABLET ORAL
Qty: 180 TABLET | Refills: 0 | Status: SHIPPED | OUTPATIENT
Start: 2020-10-26

## 2022-05-16 ENCOUNTER — OFFICE VISIT (OUTPATIENT)
Dept: ORTHOPEDIC SURGERY | Facility: CLINIC | Age: 62
End: 2022-05-16

## 2022-05-16 VITALS
WEIGHT: 259.6 LBS | BODY MASS INDEX: 31.61 KG/M2 | HEIGHT: 76 IN | DIASTOLIC BLOOD PRESSURE: 88 MMHG | SYSTOLIC BLOOD PRESSURE: 132 MMHG

## 2022-05-16 DIAGNOSIS — M20.21 HALLUX RIGIDUS OF RIGHT FOOT: Primary | ICD-10-CM

## 2022-05-16 PROCEDURE — 20600 DRAIN/INJ JOINT/BURSA W/O US: CPT | Performed by: ORTHOPAEDIC SURGERY

## 2022-05-16 PROCEDURE — 99203 OFFICE O/P NEW LOW 30 MIN: CPT | Performed by: ORTHOPAEDIC SURGERY

## 2022-05-16 RX ORDER — CHLORTHALIDONE 25 MG/1
12.5 TABLET ORAL DAILY
COMMUNITY
End: 2023-01-16

## 2022-05-16 RX ORDER — ROPIVACAINE HYDROCHLORIDE 5 MG/ML
1 INJECTION, SOLUTION EPIDURAL; INFILTRATION; PERINEURAL
Status: COMPLETED | OUTPATIENT
Start: 2022-05-16 | End: 2022-05-16

## 2022-05-16 RX ORDER — VALSARTAN 320 MG/1
320 TABLET ORAL DAILY
COMMUNITY
Start: 2022-04-05

## 2022-05-16 RX ORDER — TRIAMCINOLONE ACETONIDE 40 MG/ML
20 INJECTION, SUSPENSION INTRA-ARTICULAR; INTRAMUSCULAR
Status: COMPLETED | OUTPATIENT
Start: 2022-05-16 | End: 2022-05-16

## 2022-05-16 RX ADMIN — ROPIVACAINE HYDROCHLORIDE 1 ML: 5 INJECTION, SOLUTION EPIDURAL; INFILTRATION; PERINEURAL at 14:55

## 2022-05-16 RX ADMIN — TRIAMCINOLONE ACETONIDE 20 MG: 40 INJECTION, SUSPENSION INTRA-ARTICULAR; INTRAMUSCULAR at 14:55

## 2022-05-16 NOTE — PROGRESS NOTES
Procedure   Small Joint Arthrocentesis: R great MTP  Consent given by: patient  Site marked: site marked  Timeout: Immediately prior to procedure a time out was called to verify the correct patient, procedure, equipment, support staff and site/side marked as required   Supporting Documentation  Indications: pain   Procedure Details  Location: great toe - R great MTP  Preparation: Patient was prepped and draped in the usual sterile fashion  Needle size: 25 G  Approach: medial  Medications administered: 1 mL ropivacaine 0.5 %; 20 mg triamcinolone acetonide 40 MG/ML  Patient tolerance: patient tolerated the procedure well with no immediate complications

## 2022-05-16 NOTE — PROGRESS NOTES
[facility] [place]    Subjective     Chief Complaint   Patient presents with   • Right Foot - Pain        HPI    Teresa Ang is a 62 y.o. male who presents with new problem of: right foot pain.  Onset: atraumatic and gradual in nature. The issue has been ongoing for 15 year(s). Pain is a 9/10 on the pain scale. Pain is described as aching, throbbing and stabbing. Associated symptoms include pain, swelling and stiffness. The pain is worse with walking, climbing stairs, working and running and driving ; resting, ice and pain medication and/or NSAID improve the pain. Previous treatments have included: NSAIDS and physical therapy.  History of hallux rigidus, with response to an injection about 5 years ago.    I have reviewed the following portions of the patient's history and agree with: History of Present Illness and Review of Systems    Patient Active Problem List   Diagnosis   • Hypertensive heart disease   • Hypertension   • Osteoarthritis   • Thomas's syndrome   • Chest pain   • Exertional dyspnea   • Essential hypertension   • Dyslipidemia     Past Medical History:   Diagnosis Date   • Arthritis    • Arthritis of neck 1/1/1990   • Fracture of ankle 1991    Small fracture dislocated.  Healed with cast   • Fracture, finger 1978   • Fracture, foot 2013   • Thomas's syndrome    • Hypertension    • Tear of meniscus of knee 1993    Had surgery for torn cartlidge      Past Surgical History:   Procedure Laterality Date   • CARDIAC CATHETERIZATION Left 11/11/2019    Procedure: Left Heart Cath;  Surgeon: Hunter Branham MD;  Location: [facility] INVASIVE LOCATION;  Service: Cardiology   • KNEE SURGERY Bilateral       Family History   Problem Relation Age of Onset   • Heart attack Mother    • Cancer Father         Skin   • Heart attack Father      Social History     Socioeconomic History   • Marital status:    Tobacco Use   • Smoking status: Never Smoker   • Smokeless tobacco: Never  Used   Substance and Sexual Activity   • Alcohol use: Yes     Comment: 8 10 10 weekly. Various   • Drug use: No   • Sexual activity: Yes     Partners: Female     Birth control/protection: None      Current Outpatient Medications on File Prior to Visit   Medication Sig Dispense Refill   • carvedilol (COREG) 25 MG tablet TAKE 1 TABLET BY MOUTH TWICE A DAY WITH MEALS 180 tablet 0   • chlorthalidone (HYGROTON) 12.5 MG half tablet Take 12.5 mg by mouth Daily.     • naproxen sodium (ALEVE) 220 MG tablet Take 220 mg by mouth Daily.     • omeprazole (priLOSEC) 20 MG capsule Take 20 mg by mouth Daily.     • valsartan (DIOVAN) 320 MG tablet Take 320 mg by mouth Daily.     • [DISCONTINUED] amLODIPine (NORVASC) 10 MG tablet TAKE 1 TABLET BY MOUTH EVERY DAY 90 tablet 0   • [DISCONTINUED] aspirin 81 MG tablet Take 81 mg by mouth Daily.     • [DISCONTINUED] chlorthalidone (HYGROTON) 25 MG tablet TAKE 1 TABLET BY MOUTH EVERY DAY 90 tablet 0   • [DISCONTINUED] methylPREDNISolone (MEDROL, CORY,) 4 MG tablet Take as directed on package instructions. 1 each 0     No current facility-administered medications on file prior to visit.      No Known Allergies     Review of Systems   Constitutional: Positive for unexpected weight change. Negative for activity change, appetite change, chills, diaphoresis, fatigue and fever.   HENT: Positive for nosebleeds and sinus pressure. Negative for congestion, dental problem, drooling, ear discharge, ear pain, facial swelling, hearing loss, mouth sores, postnasal drip, rhinorrhea, sneezing, sore throat, tinnitus, trouble swallowing and voice change.    Eyes: Negative for photophobia, pain, discharge, redness, itching and visual disturbance.   Respiratory: Negative for apnea, cough, choking, chest tightness, shortness of breath, wheezing and stridor.    Cardiovascular: Negative for chest pain, palpitations and leg swelling.   Gastrointestinal: Negative for abdominal distention, abdominal pain, anal  "bleeding, blood in stool, constipation, diarrhea, nausea, rectal pain and vomiting.   Endocrine: Negative for cold intolerance, heat intolerance, polydipsia, polyphagia and polyuria.   Genitourinary: Negative for decreased urine volume, difficulty urinating, dysuria, enuresis, flank pain, frequency, genital sores, hematuria and urgency.   Musculoskeletal: Positive for arthralgias, joint swelling, neck pain and neck stiffness. Negative for back pain, gait problem and myalgias.   Skin: Negative for color change, pallor, rash and wound.   Allergic/Immunologic: Positive for environmental allergies. Negative for food allergies and immunocompromised state.   Neurological: Positive for facial asymmetry, light-headedness and headaches. Negative for dizziness, tremors, seizures, syncope, speech difficulty, weakness and numbness.   Hematological: Negative for adenopathy. Does not bruise/bleed easily.   Psychiatric/Behavioral: Negative for agitation, behavioral problems, confusion, decreased concentration, dysphoric mood, hallucinations, self-injury, sleep disturbance and suicidal ideas. The patient is not nervous/anxious and is not hyperactive.         Objective      Physical Exam  /88   Ht 193 cm (75.98\")   Wt 118 kg (259 lb 9.6 oz)   BMI 31.61 kg/m²     Body mass index is 31.61 kg/m².    General:   Mental Status:  Alert   Appearance: Cooperative, in no acute distress   Build and Nutrition: Well-nourished well-developed male   Orientation: Alert and oriented to person, place and time   Posture: Normal   Gait: Nonantalgic    Integument:   Right foot: No skin lesions, no rash, no ecchymosis    Lower Extremities:   Right Foot:    Tenderness:  Over the first metatarsal phalangeal joint    Swelling:  None  Deformities:  Enlarged first metatarsal phalangeal joint      Imaging/Studies      Imaging Results (Last 24 Hours)     Procedure Component Value Units Date/Time    XR Foot 3+ View Right [[id number]] Resulted: 05/16/22 " 1441     Updated: 05/16/22 1442    Narrative:      Right Foot Radiographs  Indication: right foot pain  Views: Weight-bearing AP and lateral, with oblique views of the right foot    Comparison: 10/23/2017    Findings:  Advanced arthritis of the hallux metatarsal phalangeal joint, worsening   compared to previous imaging, with loss of joint space and bone-on-bone   contact, with osteophytes on the metatarsal head.          Assessment and Plan     Diagnoses and all orders for this visit:    1. Hallux rigidus of right foot (Primary)  -     XR Foot 3+ View Right  -     Small Joint Arthrocentesis: R great MTP        1. Hallux rigidus of right foot        Reviewed my findings with the patient.  He would like to have an injection, this was provided.  I will see him back in 3 months, but sooner for any problems.  If he continues to have issues, surgical intervention may be considered.    Procedure Note:  The potential benefits of performing a therapeutic right hallux metatarsal phalangeal joint injection, as well as potential risks (including, but not limited to infection, swelling, pain, bleeding, bruising, nerve/blood vessel damage, skin color changes, transient elevation in blood glucose levels, and fat atrophy) were discussed with the patient.  After informed consent, timeout procedure was performed, and the skin on the right great toe metatarsal phalangeal joint was prepped with chlorhexidine soap and alcohol, after which ethyl chloride was applied to the skin at the injection site. Via the medial approach, 0.5ml of Kenalog 40mg/ml mixed with 1ml 0.5% ropivacaine plain was injected into the joint.  The patient tolerated the procedure well, experiencing 60% improvement a few minutes following the injection. There were no complications.  Band-Aid was applied to the injection site. Post-procedural instructions were given to the patient and/or their caregiver.      Return in about 3 months (around 8/16/2022).      Hunter  LUIS MANUEL Aponte MD  05/16/22  15:05 EDT

## 2023-01-16 ENCOUNTER — OFFICE VISIT (OUTPATIENT)
Dept: ORTHOPEDIC SURGERY | Facility: CLINIC | Age: 63
End: 2023-01-16
Payer: COMMERCIAL

## 2023-01-16 VITALS
SYSTOLIC BLOOD PRESSURE: 144 MMHG | DIASTOLIC BLOOD PRESSURE: 92 MMHG | WEIGHT: 256.2 LBS | BODY MASS INDEX: 31.2 KG/M2 | HEIGHT: 76 IN

## 2023-01-16 DIAGNOSIS — I87.8 VENOUS STASIS: ICD-10-CM

## 2023-01-16 DIAGNOSIS — M79.671 RIGHT FOOT PAIN: Primary | ICD-10-CM

## 2023-01-16 PROCEDURE — 99213 OFFICE O/P EST LOW 20 MIN: CPT | Performed by: ORTHOPAEDIC SURGERY

## 2023-01-16 RX ORDER — FUROSEMIDE 40 MG/1
TABLET ORAL
COMMUNITY
Start: 2023-01-09

## 2023-01-16 NOTE — PROGRESS NOTES
ESTABLISHED PATIENT    Patient: Teresa Ang  : 1960    Primary Care Provider: Fuad Alexis MD    Requesting Provider: As above    No chief complaint on file.      History    Chief Complaint: ***    History of Present Illness: ***    Current Outpatient Medications on File Prior to Visit   Medication Sig Dispense Refill   • carvedilol (COREG) 25 MG tablet TAKE 1 TABLET BY MOUTH TWICE A DAY WITH MEALS 180 tablet 0   • chlorthalidone (HYGROTON) 12.5 MG half tablet Take 12.5 mg by mouth Daily.     • naproxen sodium (ALEVE) 220 MG tablet Take 220 mg by mouth Daily.     • omeprazole (priLOSEC) 20 MG capsule Take 20 mg by mouth Daily.     • valsartan (DIOVAN) 320 MG tablet Take 320 mg by mouth Daily.       No current facility-administered medications on file prior to visit.      No Known Allergies   Past Medical History:   Diagnosis Date   • Arthritis    • Arthritis of neck 1990   • Fracture of ankle     Small fracture dislocated.  Healed with cast   • Fracture, finger    • Fracture, foot    • Thomas's syndrome    • Hypertension    • Tear of meniscus of knee     Had surgery for torn cartlidge     Past Surgical History:   Procedure Laterality Date   • CARDIAC CATHETERIZATION Left 2019    Procedure: Left Heart Cath;  Surgeon: Hunter Branham MD;  Location: [facility] INVASIVE LOCATION;  Service: Cardiology   • KNEE SURGERY Bilateral      Family History   Problem Relation Age of Onset   • Heart attack Mother    • Cancer Father         Skin   • Heart attack Father       Social History     Socioeconomic History   • Marital status:    Tobacco Use   • Smoking status: Never   • Smokeless tobacco: Never   Substance and Sexual Activity   • Alcohol use: Yes     Comment: 8 10 10 weekly. Various   • Drug use: No   • Sexual activity: Yes     Partners: Female     Birth control/protection: None, Vasectomy        Review of Systems   Constitutional: Negative.    HENT: Negative.   "  Eyes: Negative.    Respiratory: Negative.    Cardiovascular: Negative.    Gastrointestinal: Negative.    Endocrine: Negative.    Genitourinary: Negative.    Musculoskeletal: Positive for arthralgias.   Skin: Negative.    Allergic/Immunologic: Negative.    Neurological: Negative.    Hematological: Negative.    Psychiatric/Behavioral: Negative.        The following portions of the patient's history were reviewed and updated as appropriate: {history reviewed:20406::\"allergies\",\"current medications\",\"past family history\",\"past medical history\",\"past social history\",\"past surgical history\",\"problem list\"}.    Physical Exam:   There were no vitals taken for this visit.  GENERAL: Body habitus: {body habitus:63690}    Lower extremity edema: Left: {ltdedema:12593::\"none\"}; Right: {ltdedema:38537::\"none\"}    Gait: {ltdgait:97802::\"normal\"}     Mental Status:  {mental status:19103}  MSK:  Tibia:  Right:  {tibia exam:06979}; Left:  {tibia exam:39784}        Ankle:  Right: {foot/ankle exam:15180}; Left:  {foot/ankle exam:80314}        Foot:  Right:  {foot/ankle exam:87567}; Left:  {foot/ankle exam:54707}    NEURO Sensation:  {sensation:27995}    Medical Decision Making    Data Review:   {Data Review:86758}    Assessment/Plan/Diagnosis/Treatment Options:   There are no diagnoses linked to this encounter.  ***    Chandni Araiza MD                      "

## 2023-01-16 NOTE — PROGRESS NOTES
NEW PATIENT    Patient: Teresa Ang  : 1960    Primary Care Provider: Fuda Alexis MD    Requesting Provider: As above    Pain of the Right Foot      History    Chief Complaint: Right great toe pain    History of Present Illness: This is an extremely pleasant 62-year-old gentleman with longstanding right hallux rigidus.  He has had injuries in the past and then horses stepped on it.  He has had 3 injections with Dr. Aponte.  The first 1 helped a long time, second 1 several months and the third 1 only about a month.  He is here wondering if there is anything else that can be done.  He has not as yet had turf toe inserts.  He has not tried Voltaren gel.  He trains Thoroughbred's at the training center.  He is on his feet a great deal.  He rates the pain a 0-10 out of 10.  Worse with activity and pushing off.  He also has developed a second hammertoe, but notes that it does not hurt      The patient does have a personal or family history of DVT, PE, hypercoagulable state or risk factors for clotting.         Current Outpatient Medications on File Prior to Visit   Medication Sig Dispense Refill   • carvedilol (COREG) 25 MG tablet TAKE 1 TABLET BY MOUTH TWICE A DAY WITH MEALS 180 tablet 0   • furosemide (LASIX) 40 MG tablet      • naproxen sodium (ALEVE) 220 MG tablet Take 220 mg by mouth Daily.     • omeprazole (priLOSEC) 20 MG capsule Take 20 mg by mouth Daily.     • valsartan (DIOVAN) 320 MG tablet Take 320 mg by mouth Daily.     • [DISCONTINUED] chlorthalidone (HYGROTON) 12.5 MG half tablet Take 12.5 mg by mouth Daily.       No current facility-administered medications on file prior to visit.      No Known Allergies   Past Medical History:   Diagnosis Date   • Arthritis    • Arthritis of neck 1990   • Fracture of ankle     Small fracture dislocated.  Healed with cast   • Fracture, finger    • Fracture, foot    • Thomas's syndrome    • Hypertension    • Tear of meniscus of knee  "1993    Had surgery for torn naveendgporter     Past Surgical History:   Procedure Laterality Date   • CARDIAC CATHETERIZATION Left 11/11/2019    Procedure: Left Heart Cath;  Surgeon: Hunter Branham MD;  [facility];  Service: Cardiology   • KNEE SURGERY Bilateral      Family History   Problem Relation Age of Onset   • Heart attack Mother    • Cancer Father         Skin   • Heart attack Father       Social History     Socioeconomic History   • Marital status:    Tobacco Use   • Smoking status: Never   • Smokeless tobacco: Never   Substance and Sexual Activity   • Alcohol use: Yes     Comment: 8 10 10 weekly. Various   • Drug use: No   • Sexual activity: Yes     Partners: Female     Birth control/protection: None, Vasectomy        Review of Systems   Constitutional: Negative.    HENT: Negative.    Eyes: Negative.    Respiratory: Negative.    Cardiovascular: Negative.    Gastrointestinal: Negative.    Endocrine: Negative.    Genitourinary: Negative.    Musculoskeletal: Positive for arthralgias.   Skin: Negative.    Allergic/Immunologic: Negative.    Neurological: Negative.    Hematological: Negative.    Psychiatric/Behavioral: Negative.        The following portions of the patient's history were reviewed and updated as appropriate: allergies, current medications, past family history, past medical history, past social history, past surgical history and problem list.    Physical Exam:   /92   Ht 193 cm (75.98\")   Wt 116 kg (256 lb 3.2 oz)   BMI 31.20 kg/m²   GENERAL: Body habitus: overweight    Lower extremity edema: Right: 2+ pitting; Left: 2+ pitting    Varicose veins:  Right: mild; Left: mild    Gait: normal     Mental Status:  awake and alert; oriented to person, place, and time    Voice:  clear  SKIN:  Lower extremity: Normal    Hair Growth(lower extremity):  Right:normal; Left:  normal  NAILS: Toenails: thick  HEENT: Head: Normocephalic, atraumatic,  without obvious " abnormality.  eye: normal external eye, no icterus  ears:normal external ears  PULM:  Repiratory effort normal  CV:  Dorsalis Pedis:  Right: 2+; Left:2+    Posterior Tibial: Right:2+; Left:2+    Capillary Refill:  Brisk  MSK:  Hand:mild arthritis, Heberden's nodes            Foot:  Right:  Tender in the first MTPJ, very stiff with only 10 degree dorsiflexion plantarflexion, moderate hallux valgus interphalangeus, second toe has a stiff hammertoe but nontender; Left:  Nontender but also moderately stiff 30 degrees dorsiflexion 20 plantar, small dorsal osteophytes, milder second hammertoe      NEURO:      Harvest-Hamzah 5.07 monofilament test: Slightly decreased     Lower extremity sensation: Intact to light touch         Motor Function: all 5/5         Medical Decision Making    Data Review:   ordered and reviewed x-rays today    Assessment and Plan/ Diagnosis/Treatment options:   1. Right foot pain  He has grade 3 hallux rigidus on the right.  He has an asymptomatic hammertoe.  Milder hallux rigidus on the left.  We talked about the options.  He would like to try and avoid surgery if possible.  I explained arthritis explained the loss of cartilage etc.  2 things he has not as yet tried are turf toe inserts and topical Voltaren gel.  I explained how to use these and I gave him a prescription for the inserts.  If it came to surgery because of his heavy lifting and hard use of his feet I would do a fusion, I do not think Cartiva would be effective.  We went over everything in detail.  He will return if it does not help.  - XR Foot 2 View Right    2. Venous stasis  He definitely needs compression socks. I explained edema and venous stasis to the patient, and the often hereditary nature of the problem, and the contribution of weight, trauma, age, etc. I explained how these factors cause edema (due to gravity, etc) I explained how the edema can lead to ulceration.  I explained how the edema can cause pain, stiffness,  aching, cramping, etc. I explained that it is a very very common problem, so common that even [employer] markets compression stockings.  I explained that diuretics cannot correct the problem. I recommend wearing support stockings (compression stockings) daily, we discussed what type and where to find them                  Part of this encounter note is an electronic transcription/translation of spoken language to printed text. The electronic translation of spoken language may permit erroneous, or at times, nonsensical words or phrases to be inadvertently transcribed; Although I have reviewed the note for such errors, some may still exist.          Chandni Araiza MD

## (undated) DEVICE — PK CATH CARD 10

## (undated) DEVICE — INTRO SHEATH ART/FEM ENGAGE .038 6F12CM

## (undated) DEVICE — CATH DIAG EXPO M/ PK 6FR FL4/FR4 PIG 3PK

## (undated) DEVICE — KT MANIFOLD CATHLAB CUST

## (undated) DEVICE — ANGIO-SEAL VIP VASCULAR CLOSURE DEVICE: Brand: ANGIO-SEAL

## (undated) DEVICE — GW J TP FIX CORE .035 150